# Patient Record
Sex: MALE | Race: WHITE | Employment: FULL TIME | URBAN - METROPOLITAN AREA
[De-identification: names, ages, dates, MRNs, and addresses within clinical notes are randomized per-mention and may not be internally consistent; named-entity substitution may affect disease eponyms.]

---

## 2018-02-12 ENCOUNTER — OFFICE VISIT (OUTPATIENT)
Dept: FAMILY MEDICINE | Facility: OTHER | Age: 58
End: 2018-02-12
Payer: COMMERCIAL

## 2018-02-12 VITALS — HEART RATE: 69 BPM | SYSTOLIC BLOOD PRESSURE: 177 MMHG | DIASTOLIC BLOOD PRESSURE: 127 MMHG

## 2018-02-12 DIAGNOSIS — I10 BENIGN ESSENTIAL HYPERTENSION WITH TARGET BLOOD PRESSURE BELOW 140/90: ICD-10-CM
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
## 2018-02-12 DIAGNOSIS — S16.1XXA STRAIN OF NECK MUSCLE, INITIAL ENCOUNTER: Primary | ICD-10-CM

## 2018-02-12 PROCEDURE — 99203 OFFICE O/P NEW LOW 30 MIN: CPT | Performed by: FAMILY MEDICINE

## 2018-02-12 RX ORDER — IBUPROFEN 800 MG/1
800 TABLET, FILM COATED ORAL EVERY 8 HOURS PRN
Qty: 60 TABLET | Refills: 1 | Status: SHIPPED | OUTPATIENT
Start: 2018-02-12

## 2018-02-12 RX ORDER — CYCLOBENZAPRINE HCL 10 MG
10 TABLET ORAL 3 TIMES DAILY PRN
Qty: 90 TABLET | Refills: 1 | Status: SHIPPED | OUTPATIENT
Start: 2018-02-12

## 2018-02-12 RX ORDER — LISINOPRIL 10 MG/1
10 TABLET ORAL DAILY
Qty: 30 TABLET | Refills: 0 | Status: SHIPPED | OUTPATIENT
Start: 2018-02-12 | End: 2018-03-16

## 2018-02-12 ASSESSMENT — PAIN SCALES - GENERAL: PAINLEVEL: SEVERE PAIN (6)
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
## 2018-02-12 NOTE — PATIENT INSTRUCTIONS
Thank you for visiting [facility]    Take ibuprofen for pain, flexeril for muscle spasms.  Caution with sedation from flexeril.    If pain is increasing significantly or new symptoms, then we should pursue some imaging or have you seen in ER.    Resume lisinopril.    Your blood pressure was high today.  Please come back in 1-4 weeks for a nurse visit blood pressure check.     Please make an appointment with your either myself or your provider  in 1 month for follow up.       If you had imaging scheduled please refer to your radiology prep sheet.    Appointment    Date_______________     Time_____________    Day:   M TU W TH F    With____________________________    Location_________________________    If you need medication refills, please contact your pharmacy 3 days before your prescriptions runs out. If you are out of refills, your pharmacy will contact contact the clinic.    Contact us or return if questions or concerns.     -Your Care Team:  MD Marcelle Waters PA-C Joel De Haan, PA-C Elizabeth McLean, APRN CNP    General information about your clinic      Clinic hours:     Lab hours:  Phone [phone]  Monday 7:30 am-7 pm    Monday 8:30 am-6:30 pm  Tuesday-Friday 7:30 am-5 pm   Tuesday-Friday 8:30 am-4:30 pm    Pharmacy hours:  Phone [phone]  Monday 8:30 am-7pm  Tuesday-Friday 8:30am-6 pm                                       Mychart assistance [phone]        We would like to hear from you, how was your visit today?    Dai Cantrell  Patient Information Supervisor   Patient Care Supervisor  [facility], and [facility], and [facility]  [phone]
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
## 2018-03-16 DIAGNOSIS — I10 BENIGN ESSENTIAL HYPERTENSION WITH TARGET BLOOD PRESSURE BELOW 140/90: ICD-10-CM

## 2018-03-16 NOTE — TELEPHONE ENCOUNTER
Lisinopril    Routing refill request to provider for review/approval because:  Labs out of range:  B/P  Labs not current:  Creat and potassium    Hilary Miller, RN, BSN

## 2018-03-16 NOTE — LETTER
Maycol Kyle  [id number] [address] [address]    March 19, 2018      Dear Maycol Kyle    APPOINTMENT REMINDER:   Our records indicates that it is time for you to be seen for a recheck.     Your current medication request will be approved for one refill but you will need to be seen before any additional refills can be approved.  Taking care of your health is important to us, and ongoing visits with your provider are vital to your care.    We look forward to seeing you in the near future.  You may call our office at [phone] to schedule a visit.     Please disregard this notice if you have already made an appointment.      Sincerely,    [place] [facility]     [facility]  [address] [facility] [address]  Phone: [phone]

## 2018-03-19 RX ORDER — LISINOPRIL 10 MG/1
TABLET ORAL
Qty: 30 TABLET | Refills: 0 | Status: SHIPPED | OUTPATIENT
Start: 2018-03-19

## 2018-03-19 NOTE — TELEPHONE ENCOUNTER
Your medication has been refilled.  Please schedule a visit to follow up with your provider on how this medication is working for you before your next refill.  We need to be sure everything is working well and stable prior to further refills.

## 2018-04-17 ENCOUNTER — TELEPHONE (OUTPATIENT)
Dept: FAMILY MEDICINE | Facility: OTHER | Age: 58
End: 2018-04-17

## 2018-04-17 DIAGNOSIS — E78.5 HYPERLIPIDEMIA LDL GOAL <130: Primary | ICD-10-CM

## 2018-04-17 NOTE — TELEPHONE ENCOUNTER
Summary:    Patient is due/failing the following:   BP CHECK, FOLLOW UP and LDL    Action needed:   Patient needs office visit for follow up and BP check . and Patient needs fasting lab only appointment    Type of outreach:    Phone, left message for patient to call back.     Questions for provider review:    None                                                                                                                                    Miranda Stevenson     Chart routed to Care Team .        Panel Management Review      Patient has the following on his problem list:     Hypertension   Last three blood pressure readings:  BP Readings from Last 3 Encounters:   02/12/18 (!) 177/127   03/25/14 122/80   03/10/14 120/70     Blood pressure: FAILED    HTN Guidelines:  Age 18-59 BP range:  Less than 140/90  Age 60-85 with Diabetes:  Less than 140/90  Age 60-85 without Diabetes:  less than 150/90      Composite cancer screening  Chart review shows that this patient is due/due soon for the following None

## 2018-04-17 NOTE — LETTER
[address]   [facility] [address]  Phone: [phone]  May 2, 2018      Maycol Kyle  [address] [facility] [place] [address]      Dear Maycol,    We care about your health and have reviewed your health plan including your medical conditions, medications, and lab results.  Based on this review, it is recommended that you follow up regarding the following health topic(s):  -Cholesterol  -High Blood Pressure    We recommend you take the following action(s):  -schedule a FOLLOWUP OFFICE APPOINTMENT.  We will perform the following labs:  Lipids (fasting cholesterol - nothing to eat except water and/or meds for 8-10 hours).     Please call us at the [facility] - [phone] (or use Inhabi) to address the above recommendations.     Thank you for trusting [facility] and we appreciate the opportunity to serve you.  We look forward to supporting your healthcare needs in the future.    Healthy Regards,    Your Health Care Team  [facility]

## 2018-08-13 ENCOUNTER — TELEPHONE (OUTPATIENT)
Dept: FAMILY MEDICINE | Facility: OTHER | Age: 58
End: 2018-08-13

## 2018-09-10 NOTE — TELEPHONE ENCOUNTER
Summary:    Patient is due/failing the following:   Hypertension follow up, BP CHECK and LDL    Action needed:   Patient needs office visit for follow up. and Patient needs fasting lab only appointment    Type of outreach:    Phone, left message for patient to call back.     Questions for provider review:    None                                                                                                                                    Miranda Stevenson       Chart routed to Care Team .          Panel Management Review      Patient has the following on his problem list:     Hypertension   Last three blood pressure readings:  BP Readings from Last 3 Encounters:   02/12/18 (!) 177/127   03/25/14 122/80   03/10/14 120/70     Blood pressure: FAILED    HTN Guidelines:  Age 18-59 BP range:  Less than 140/90  Age 60-85 with Diabetes:  Less than 140/90  Age 60-85 without Diabetes:  less than 150/90      Composite cancer screening  Chart review shows that this patient is due/due soon for the following None

## 2019-02-19 ENCOUNTER — TELEPHONE (OUTPATIENT)
Dept: FAMILY MEDICINE | Facility: OTHER | Age: 59
End: 2019-02-19

## 2019-02-19 NOTE — LETTER
[address]   [facility] [address]  Phone: [phone]  March 1, 2019      Maycol Kyle  [address] [facility] [place] [address]      Dear Maycol,    We care about your health and have reviewed your health plan including your medical conditions, medications, and lab results.  Based on this review, it is recommended that you follow up regarding the following health topic(s):  -High Blood Pressure  -Wellness (Physical) Visit     We recommend you take the following action(s):  -schedule a WELLNESS (Physical) APPOINTMENT.  We will perform the following labs: Lipids (fasting cholesterol - nothing to eat except water and/or meds for 8-10 hours).     Please call us at the [facility] - [phone] (or use Playspace) to address the above recommendations.     Thank you for trusting [facility] and we appreciate the opportunity to serve you.  We look forward to supporting your healthcare needs in the future.    Healthy Regards,    Your Health Care Team  [facility]

## 2019-02-19 NOTE — TELEPHONE ENCOUNTER
Summary:    Patient is due/failing the following:   BP CHECK, LDL and PHYSICAL    Action needed:   Patient needs office visit for Physical and BP check . and Patient needs fasting lab only appointment    Type of outreach:    Phone, left message for patient to call back.     Questions for provider review:    None                                                                                                                                    Miranda Stevenson     Chart routed to Care Team .          Panel Management Review      Patient has the following on his problem list:     Hypertension   Last three blood pressure readings:  BP Readings from Last 3 Encounters:   02/12/18 (!) 177/127   03/25/14 122/80   03/10/14 120/70     Blood pressure: FAILED    HTN Guidelines:  Age 18-59 BP range:  Less than 140/90  Age 60-85 with Diabetes:  Less than 140/90  Age 60-85 without Diabetes:  less than 150/90      Composite cancer screening  Chart review shows that this patient is due/due soon for the following None

## 2019-12-08 ENCOUNTER — HEALTH MAINTENANCE LETTER (OUTPATIENT)
Age: 59
End: 2019-12-08

## 2021-01-10 ENCOUNTER — HEALTH MAINTENANCE LETTER (OUTPATIENT)
Age: 61
End: 2021-01-10

## 2021-10-23 ENCOUNTER — HEALTH MAINTENANCE LETTER (OUTPATIENT)
Age: 61
End: 2021-10-23

## 2022-02-12 ENCOUNTER — HEALTH MAINTENANCE LETTER (OUTPATIENT)
Age: 62
End: 2022-02-12

## 2022-10-09 ENCOUNTER — HEALTH MAINTENANCE LETTER (OUTPATIENT)
Age: 62
End: 2022-10-09

## 2023-02-18 ENCOUNTER — HEALTH MAINTENANCE LETTER (OUTPATIENT)
Age: 63
End: 2023-02-18

## 2024-03-16 ENCOUNTER — HEALTH MAINTENANCE LETTER (OUTPATIENT)
Age: 64
End: 2024-03-16